# Patient Record
(demographics unavailable — no encounter records)

---

## 2024-10-31 NOTE — PHYSICAL EXAM
[TextEntry] : Gen: NAD, well-appearing HEENT: Supple neck, MMM Pulm: CTA CV: RRR, no rub Back: No spinal or CVA tenderness Abd: +BS, soft, nontender/nondistended LE: Warm, no edema Neuro: No focal deficits Psych: Normal affect Skin: Warm, without rashes MSK: Widespread gouty arthritis and tophi

## 2024-10-31 NOTE — ASSESSMENT
[FreeTextEntry1] : 67-year-old male with past medical history significant for hypertension, hyperlipidemia, coronary artery disease status post PCI and gouty arthritis who was seen in outpatient nephrology clinic today for discussion of labs and evaluation of renal function.  Chronic kidney disease stage III: CKD likely from renal atherosclerotic disease, likely component of acute kidney injury in setting of losartan and spironolactone use Serum creatinine ranging between 1.5- 1.7 mg/deciliter expect baseline to be in high 40s Last labs were about 2 mg/deciliter, spironolactone was placed on hold post those labs.  Checking new set of labs today to evaluate the response post discontinuation of spironolactone. Will also be checking urinalysis, UACR and BMD labs Discussed goals of blood pressure less than 140/90 and LDL less than 100  Hypertension: Controlled, as above  Hyperlipidemia: Controlled, continue on current dose of atorvastatin Chronic gout with tophi: Continue on allopurinol

## 2024-10-31 NOTE — HISTORY OF PRESENT ILLNESS
[FreeTextEntry1] : Patient is a 67-year-old male with past medical history significant for hypertension, hyperlipidemia, coronary artery disease status post PCI and gouty arthritis who was seen in outpatient nephrology clinic today for discussion of labs and evaluation of renal function.  Follows cardiology is on a combination of spironolactone and losartan Denies dysuria, gross hematuria, SOB, CP, cough, expectoration, fever, chills, palpitation, syncope, urgency, hesitancy, swelling on feet.  No history of chronic NSAID use, nephrolithiasis or renal diseases in the family.   REVIEW OF SYSTEM:  All systems were reviewed in detail.  Pertinent positive and negatives have been mentioned in history of present illness.  The rest are negative.

## 2024-12-11 NOTE — HISTORY OF PRESENT ILLNESS
[FreeTextEntry1] : This is a 67-year-old male who has been followed by rheumatology in the past (initially seeing Dr. Wolf in 2021 and Dr. 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 Also has chronic symptoms of carpal tunnel   Patient has had chronic pain in his elbows, knees; has seen Ortho for injections in the past  Patient has also been on diclofenac, meloxicam in the past, takes etodolac as needed  In 2021 patient was on allopurinol 200mg but did not continue it. Denies recent episodes of gout flare      Initial visit 12/20/23: He was started on allopurinol    -Last visit:  10/2024   He was on allopurinol to 300mg daily  which was increased to 400mg  No recent gout flare  Reports overall joint pain improved.     Labs   10/24/24  Uric acid 6.5 GFR 47   10/4/24  Uric acid 8.2 GFR 36   9/2024  Uric acid 8.6 GFR 43   12/20/2023   CBC with WBC 6.7, hemoglobin 13, platelet 358   CMP with Cr 1.47 GFR 52   ESR 59 CRP 20 3 uric acid 9   7/ 2023   Normal/negative rheumatoid factor, NICOLASA, SSA SSB, double-stranded DNA, CK   Uric acid 8.4   ESR 35 normal for age CRP negative   Creatinine 1.41 GFR 55      Imaging   xr knee   IMPRESSION: Right knee: There is lobulated density along the medial and lateral joint lines likely related to underlying tophus given history of gout. Chronic appearing erosive change is seen along the periphery of the medial tibial plateau and likely along the periphery of the lateral femoral condyle. Small joint effusion. No evidence of acute fracture. Mild to moderate tricompartmental arthrosis of the knee. Left knee: Lobulated density along the medial joint line and posterior joint capsule is likely related to tophus given history of gout. Probable osseous erosions along the posterior margin of the tibia. Small joint effusion. Mild tricompartmental arthrosis of the knee. No evidence of acute fracture. Atherosclerotic disease.   Xr elbow      IMPRESSION: Left elbow: Numerous mineralized bodies are seen in the region of the olecranon bursa and the posterior margin of the proximal ulna. Associated swelling within the olecranon bursa. Findings are consistent with patient's history of gout with associated olecranon bursitis. No definite osseous erosion. No joint effusion. No evidence of acute fracture. Triceps enthesophyte. Right elbow: Interval development of numerous mineralized bodies along the region of the olecranon and the posterior margin of the proximal ulna. Associated soft tissue swelling within the olecranon bursa. Findings are consistent with patient's history of gout with associated olecranon bursitis. Multiple new chronic osseous erosions are seen about the olecranon at the triceps insertion. No definite joint effusion. Joint spaces are preserved. No acute fracture.      Xr hand       IMPRESSION: Right hand: Tophi are seen about the first metacarpal phalangeal, second proximal interphalangeal, third proximal interphalangeal, and fifth metacarpal phalangeal joints. Associated prominent osseous erosions are seen about these tophi, most evident at the second proximal interphalangeal joint and fifth metacarpophalangeal joints. Additional osseous erosions are scattered about the carpus and distal ulna. No evidence of acute fracture. Left hand: Tophi are seen about the thumb metacarpal phalangeal, second and fifth metacarpophalangeal, third proximal interphalangeal, fourth proximal interphalangeal, and fifth distal interphalangeal joints with associated prominent osseous erosions, most prominent at the fifth metacarpal phalangeal joint. Additional osseous erosions are seen about the carpus and distal ulna. Chronic avulsion of the ulnar styloid. No evidence of acute fracture.

## 2024-12-11 NOTE — PHYSICAL EXAM
[TextEntry] : GENERAL: Appears in no acute distress HEENT: EOMI. No conjunctival erythema. Moist mucous membranes. No nasopharyngeal ulcers NECK: Supple, no cervical lymphadenopathy CARDIOVASCULAR: RRR. S1, S2 auscultated. PULMONARY: Clear to auscultation b/l, ABDOMINAL: Soft, , nondistended. 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 Diffuse joint tenderness to palpation of elbows, joints of the hands, and knees (improved) Decreased ROM of b/l upper and lower extremities SKIN: No rashes NEURO: No focal deficits. Motor strength 5/5 in major muscle groups of b/l UE and LE. Sensation to soft touch intact in major dermatomes of b/l UE and LE. PSYCH: . Normal affect and thought process.

## 2024-12-11 NOTE — ASSESSMENT
[FreeTextEntry1] :   This is a 67-year-old Macedonian-speaking male with hypertension, diabetes, CKD, prior alcohol use, questionable cirrhosis, GI bleed (reports he had a normal 11/2023 colonoscopy besides hemorrhoids and endoscopy showing infection ?H pylori), obesity who has been followed by rheumatology in the past (initially seeing Dr. Wolf in 2021 and more recently Dr. 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 His imaging reflecting tophaceous gout with erosions in knees, elbows, hands   Also has chronic symptoms of carpal tunnel   In the past has seen Ortho for injections.   In 2021 patient was on allopurinol 200mg but did not continue it.   Treatment with ULT is indicated due to joint pain in the setting of tophi, of note he has tolerated allopurinol in the past but did not continue   -On 12/20/2023, Started allopurinol   -Currently on allopurinol 400mg with no gout flares. Will check uric acid  and plan is to increase to allopurinol 500mg.  -Prednisone taper with 20mg daily 7 days, 10 mg daily 7 days, 5 mg daily 7 days, then stop with each increase in dose to avoid gout flares. Avoid NSAIDs while on prednisone, take with food   -Discussed lifestyle management and diet with patient including but not limited to avoiding excessive alcohol, shellfish, red meat, fructose consumption which can increase serum urate levels and trigger gout attacks.   Advised on weight loss. Advised on increased consumption of low-fat dairy products, tart cherry juice which can decrease serum urate levels. Close evaluation for and treatment of hypertension, dyslipidemia, coronary artery disease, diabetes, as gout is associated with metabolic syndrome.   -Discussed side effects of allopurinol (Side effects of allopurinol were discussed with the pt in detail including risk of acute gout flare on initiation, nausea, diarrhea, rash, abnormal LFTs)   -Discussed side effects of prednisone; advised on the risk of long-term steroids including but not limited to osteonecrosis, peptic ulcers/erosive gastritis, elevated blood pressure, elevated blood sugar, weight gain, osteoporosis, cushingoid features, cataracts, glaucoma, psychosis, infections. Pt was advised to monitor blood sugar and blood pressure routinely. Only take as needed for flares.   -CKD : now following with nephrology    Follow up 3M Total time spent in review of patient history, clinical exam, management, counseling, and plan of care:  30min

## 2025-03-12 NOTE — ASSESSMENT
[FreeTextEntry1] :  This is a 67-year-old  male with hypertension, diabetes, CKD, prior alcohol use, questionable cirrhosis, GI bleed (reports he had a normal 11/2023 colonoscopy besides hemorrhoids and endoscopy showing infection ?H pylori), obesity who has been followed by rheumatology in the past (initially seeing Dr. Wolf in 2021 and more recently Dr. 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 His imaging reflecting tophaceous gout with erosions in knees, elbows, hands   Also has chronic symptoms of carpal tunnel  In the past has seen Ortho for injections.  In 2021 patient was on allopurinol 200mg but did not continue it.  Treatment with ULT is indicated due to joint pain in the setting of tophi, of note he has tolerated allopurinol in the past but did not continue   -On 12/20/2023, Started allopurinol  -Currently on allopurinol 500mg with no gout flares. Will check uric acid and plan is to increase to allopurinol 600mg.  -Prednisone taper with 20mg daily 7 days, 10 mg daily 7 days, 5 mg daily 7 days, then stop with each increase in dose to avoid gout flares. Avoid NSAIDs while on prednisone, take with food  -Discussed lifestyle management and diet with patient including but not limited to avoiding excessive alcohol, shellfish, red meat, fructose consumption which can increase serum urate levels and trigger gout attacks.  Advised on weight loss. Advised on increased consumption of low-fat dairy products, tart cherry juice which can decrease serum urate levels. Close evaluation for and treatment of hypertension, dyslipidemia, coronary artery disease, diabetes, as gout is associated with metabolic syndrome.  -Discussed side effects of allopurinol (Side effects of allopurinol were discussed with the pt in detail including risk of acute gout flare on initiation, nausea, diarrhea, rash, abnormal LFTs)  -Discussed side effects of prednisone; advised on the risk of long-term steroids including but not limited to osteonecrosis, peptic ulcers/erosive gastritis, elevated blood pressure, elevated blood sugar, weight gain, osteoporosis, cushingoid features, cataracts, glaucoma, psychosis, infections. Pt was advised to monitor blood sugar and blood pressure routinely. Only take as needed for flares.  -CKD : now following with nephrology  - Hx of carpel tunnel, patient would like eval by hand ortho for injections for numbness/tingling symptoms    Total time spent in review of patient history, clinical exam, management, counseling, and plan of care:  30min

## 2025-03-12 NOTE — PHYSICAL EXAM
[TextEntry] :     GENERAL: Appears in no acute distress HEENT: EOMI. No conjunctival erythema. Moist mucous membranes. No nasopharyngeal ulcers NECK: Supple, no cervical lymphadenopathy CARDIOVASCULAR: RRR. S1, S2 auscultated. PULMONARY: Clear to auscultation b/l, ABDOMINAL: Soft, , nondistended. 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 Diffuse joint tenderness to palpation of elbows, joints of the hands, and knees (improved) Decreased ROM of b/l upper and lower extremities SKIN: No rashes NEURO: No focal deficits. Motor strength 5/5 in major muscle groups of b/l UE and LE. PSYCH: . Normal affect and thought process.

## 2025-03-12 NOTE — HISTORY OF PRESENT ILLNESS
[FreeTextEntry1] : This is a 67-year-old male who has been followed by rheumatology in the past (initially seeing Dr. Wolf in 2021 and Dr. 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 Also has chronic symptoms of carpal tunnel  Patient has had chronic pain in his elbows, knees; has seen Ortho for injections in the past  Patient has also been on diclofenac, meloxicam in the past, takes etodolac as needed  In 2021 patient was on allopurinol 200mg but did not continue it. Denies recent episodes of gout flare        Initial visit 12/20/23:  He was started on allopurinol   -Last visit:   12/2024  He was on allopurinol to 400mg daily which was increased to 500mg  No recent gout flare  Reports overall joint pain improved. Thinks tophi have decreased He would like ortho eval for carpel tunnel        Labs   12/2024  Uric acid 6.2  GFR 56   10/24/24  Uric acid 6.5 GFR 47   10/4/24  Uric acid 8.2 GFR 36    9/2024  Uric acid 8.6 GFR 43   12/20/2023  CBC with WBC 6.7, hemoglobin 13, platelet 358  CMP with Cr 1.47 GFR 52  ESR 59 CRP 20 3 uric acid 9      7/ 2023  Normal/negative rheumatoid factor, NICOLASA, SSA SSB, double-stranded DNA, CK   Uric acid 8.4  ESR 35 normal for age CRP negative  Creatinine 1.41 GFR 55            Imaging   xr knee   IMPRESSION: Right knee: There is lobulated density along the medial and lateral joint lines likely related to underlying tophus given history of gout. Chronic appearing erosive change is seen along the periphery of the medial tibial plateau and likely along the periphery of the lateral femoral condyle. Small joint effusion. No evidence of acute fracture. Mild to moderate tricompartmental arthrosis of the knee. Left knee: Lobulated density along the medial joint line and posterior joint capsule is likely related to tophus given history of gout. Probable osseous erosions along the posterior margin of the tibia. Small joint effusion. Mild tricompartmental arthrosis of the knee. No evidence of acute fracture. Atherosclerotic disease.      Xr elbow    IMPRESSION: Left elbow: Numerous mineralized bodies are seen in the region of the olecranon bursa and the posterior margin of the proximal ulna. Associated swelling within the olecranon bursa. Findings are consistent with patient's history of gout with associated olecranon bursitis. No definite osseous erosion. No joint effusion. No evidence of acute fracture. Triceps enthesophyte. Right elbow: Interval development of numerous mineralized bodies along the region of the olecranon and the posterior margin of the proximal ulna. Associated soft tissue swelling within the olecranon bursa. Findings are consistent with patient's history of gout with associated olecranon bursitis. Multiple new chronic osseous erosions are seen about the olecranon at the triceps insertion. No definite joint effusion. Joint spaces are preserved. No acute fracture.     Xr hand      IMPRESSION: Right hand: Tophi are seen about the first metacarpal phalangeal, second proximal interphalangeal, third proximal interphalangeal, and fifth metacarpal phalangeal joints. Associated prominent osseous erosions are seen about these tophi, most evident at the second proximal interphalangeal joint and fifth metacarpophalangeal joints. Additional osseous erosions are scattered about the carpus and distal ulna. No evidence of acute fracture. Left hand: Tophi are seen about the thumb metacarpal phalangeal, second and fifth metacarpophalangeal, third proximal interphalangeal, fourth proximal interphalangeal, and fifth distal interphalangeal joints with associated prominent osseous erosions, most prominent at the fifth metacarpal phalangeal joint. Additional osseous erosions are seen about the carpus and distal ulna. Chronic avulsion of the ulnar styloid. No evidence of acute fracture.

## 2025-03-17 NOTE — PHYSICAL EXAM
[de-identified] : Exam [bilateral] wrist + Durkan's with + N/T. There is + tinel. Patient is able to delineate numbness to median-sided digits volarly. [Mild to moderate right greater than left thenar atrophy and mild weakness] Multiple sites of gouty deposits in multiple small joints [de-identified] : [4] views of [bilateral hands and wrists] were obtained today in my office and were seen by me and discussed with the patient.  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

## 2025-03-17 NOTE — ASSESSMENT
[FreeTextEntry1] : ASSESSMENT: The patient comes in today with Bonnick exacerbated complaints of bilateral wrist and hand discomfort.  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  He is currently being treated for this.  We have discussed carpal tunnel findings.  We have discussed the severity clinically.  The patient does elect for injections today.  Will continue to follow   The patient was adequately and thoroughly informed of my assessment of their current condition(s).  - This may diminish bodily function for the extremity. We discussed prognosis, tx modalities including operative and nonoperative options for the above diagnostic assessment. As always, 2nd opinion is always provided as an option.  When accessible, I was able to review other physicians note(s) including reviewing other tests, imaging results as well as personally view these results for my own interpretation.   Injection:   The risks and benefits of a steroid injection were discussed in detail. The risks include but are not limited to: pain, infection, swelling, flare response, bleeding, subcutaneous fat atrophy, skin depigmentation and/or elevation of blood sugar. The risk of incomplete resolution of symptoms, recurrence and additional intervention was reviewed and considered by the patient. The patient agreed to proceed and under a sterile prep, I injected 1 unit 6mg into 1 cc of a combination of Celestone and Lidocaine into the bilateral carpal tunnel. The patient tolerated the injection well.  The patient was adequately and thoroughly informed of my assessment of their current condition(s).  DISCUSSION: 1.  As above.  And activity modification bracing.  Follow-up 6 weeks 2.   3. [x]

## 2025-03-17 NOTE — HISTORY OF PRESENT ILLNESS
[FreeTextEntry1] : Talon is a pleasant 67-year-old male presenting today with a chronic history of gout as well as bilateral wrist and hand discomfort.  Patient describes burning and numbness and tingling.  It is affecting ADLs

## 2025-03-18 NOTE — HISTORY OF PRESENT ILLNESS
[FreeTextEntry1] : Patient is a 67-year-old male with past medical history significant for hypertension, hyperlipidemia, coronary artery disease status post PCI and gouty arthritis who was seen in outpatient nephrology clinic today for follow up of CKD3 Patient reports no health-related adverse event since last clinic visit.  NO ER/Hospitalization/urgent care visit. Denies any cardiac or urinary complaints. No dysuria, gross hematuria, SOB, LUTS. Reports BP has been well controlled. Follows rheumatology for chronic gouty arthritis, denies any acute flare REVIEW OF SYSTEM:  All systems were reviewed in detail.  Pertinent positive and negatives have been mentioned in history of present illness.  The rest are negative.

## 2025-03-18 NOTE — REASON FOR VISIT
[Follow-Up] : a follow-up visit [Language [employer] ] : provided by [employer]   [FreeTextEntry3] : [id number] [TWNoteComboBox1] : [place]

## 2025-03-18 NOTE — ASSESSMENT
[FreeTextEntry1] : 67-year-old male with past medical history significant for hypertension, hyperlipidemia, coronary artery disease status post PCI and gouty arthritis who was seen in outpatient nephrology clinic today for discussion of labs and evaluation of renal function.  Chronic kidney disease stage III: CKD likely from renal atherosclerotic disease Serum creatinine ranging between 1.5- 1.7 mg/deciliter Last serum creatinine obtained on 3/12/2025 also within range at 1.48 for a GFR of 52 Urinalysis bland with no significant proteinuria. BMD labs within normal limits. Discussed goals of blood pressure less than 140/90 and LDL less than 100  Hypertension: Controlled, as above.  Continue with amlodipine, Cozaar, metoprolol Hyperlipidemia: Controlled, continue on current dose of atorvastatin Chronic gout with tophi: Continue on allopurinol, For close follow-up with rheumatology.

## 2025-04-29 NOTE — ASSESSMENT
[FreeTextEntry1] : ASSESSMENT: The patient comes in today with chronic exacerbated complaints of bilateral wrist and hand discomfort.  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  He is currently being treated for this.  We have discussed carpal tunnel findings.  We have discussed the severity clinically.  The patient does elect for injections today.  Will continue to follow.   The patient was adequately and thoroughly informed of my assessment of their current condition(s).  - This may diminish bodily function for the extremity. We discussed prognosis, tx modalities including operative and nonoperative options for the above diagnostic assessment. As always, 2nd opinion is always provided as an option.  When accessible, I was able to review other physicians note(s) including reviewing other tests, imaging results as well as personally view these results for my own interpretation.   Injection procedure for bilateral carpal tunnel:   The risks and benefits of a steroid injection were discussed in detail. The risks include but are not limited to: pain, infection, swelling, flare response, bleeding, subcutaneous fat atrophy, skin depigmentation and/or elevation of blood sugar. The risk of incomplete resolution of symptoms, recurrence and additional intervention was reviewed and considered by the patient. The patient agreed to proceed and under a sterile prep, I injected 1 unit 6mg into 1 cc of a combination of Celestone and Lidocaine into the above stated location for the procedure. The patient tolerated the injection well.   The patient was adequately and thoroughly informed of my assessment of their current condition(s).  DISCUSSION: 1.  Injections as above.  And activity modification bracing.  Follow-up 1 month as req 2.  Positive response.  We have discussed surgical management thoroughly 3. [x]

## 2025-04-29 NOTE — PHYSICAL EXAM
[de-identified] : Exam [bilateral] wrist + Durkan's with + N/T. There is + tinel. Patient is able to delineate numbness to median-sided digits volarly. [Mild to moderate right greater than left thenar atrophy and mild weakness] Multiple sites of gouty deposits in multiple small joints [de-identified] : [4] views of [bilateral hands and wrists] were reviewed today in my office and were seen by me and discussed with the patient.  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

## 2025-05-27 NOTE — PHYSICAL EXAM
[de-identified] : Exam [bilateral] wrist + Durkan's with + N/T. There is + tinel. Patient is able to delineate numbness to median-sided digits volarly. [Mild to moderate right greater than left thenar atrophy and mild weakness] Multiple sites of gouty deposits in multiple small joints

## 2025-05-27 NOTE — ASSESSMENT
[FreeTextEntry1] : ASSESSMENT: The patient comes in today with chronic exacerbated complaints of bilateral wrist and hand discomfort.  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  He is currently being treated for this.  We have discussed carpal tunnel findings.  We have discussed the severity clinically.  The patient does elect for injection today.  Will continue to follow. We have discussed carpal tunnel surgery.   The patient has significant findings consistent with carpal tunnel syndrome. We discussed nerve study findings when available and prognosis of this condition.  I told them that surgery would be of significant benefit for their acute painful symptoms, however the efficacy of surgery for sensory and motor recovery will be determined only with time.  These might not improve at all. With this in mind they elected to proceed with a carpal tunnel release.   Surgical Consent Discussion:   I explained the risks and benefits of surgical intervention to the patient. The risks include, but are not limited to: pain, infection, swelling, stiffness, injury to underlying neurovascular structures, scar sensitivity, incomplete resolution of symptoms, the possibility of the need for future surgery and finally the need to comply with a post-operative occupational therapy protocol. She understands, agrees and informed consent was obtained. The patients surgery will be scheduled in the near future.   The patient was adequately and thoroughly informed of my assessment of their current condition(s).  - This may diminish bodily function for the extremity. We discussed prognosis, tx modalities including operative and nonoperative options for the above diagnostic assessment. As always, 2nd opinion is always provided as an option.  When accessible, I was able to review other physicians note(s) including reviewing other tests, imaging results as well as personally view these results for my own interpretation.   Injection procedure for left carpal tunnel:   The risks and benefits of a steroid injection were discussed in detail. The risks include but are not limited to: pain, infection, swelling, flare response, bleeding, subcutaneous fat atrophy, skin depigmentation and/or elevation of blood sugar. The risk of incomplete resolution of symptoms, recurrence and additional intervention was reviewed and considered by the patient. The patient agreed to proceed and under a sterile prep, I injected 1 unit 6mg into 1 cc of a combination of Celestone and Lidocaine into the above stated location for the procedure. The patient tolerated the injection well.   The patient was adequately and thoroughly informed of my assessment of their current condition(s).  DISCUSSION: 1.  Injection as above.  And activity modification bracing.   2.  Positive response.   3.  He elects to proceed with right carpal tunnel release

## 2025-05-27 NOTE — HISTORY OF PRESENT ILLNESS
[FreeTextEntry1] : Talon is a pleasant 67-year-old male presenting today with a chronic history of gout as well as bilateral wrist and hand discomfort.  Patient describes burning and numbness and tingling.  It is affecting ADLs.  We have trialed injections as well as bracing and symptoms persist.  He does tell me that injections have been helpful for him.  He would like to consider surgery. Strong history of inflammatory disease and gout

## 2025-05-27 NOTE — PHYSICAL EXAM
[de-identified] : Exam [bilateral] wrist + Durkan's with + N/T. There is + tinel. Patient is able to delineate numbness to median-sided digits volarly. [Mild to moderate right greater than left thenar atrophy and mild weakness] Multiple sites of gouty deposits in multiple small joints

## 2025-06-11 NOTE — REASON FOR VISIT
[Follow-Up: _____] : a [unfilled] follow-up visit [FreeTextEntry1] : :  Sarah [id number] (patient can speak English but used  as well today)

## 2025-06-11 NOTE — PHYSICAL EXAM
[TextEntry] : GENERAL: Appears in no acute distress HEENT: EOMI. No conjunctival erythema. Moist mucous membranes. No nasopharyngeal ulcers NECK: Supple, no cervical lymphadenopathy CARDIOVASCULAR: RRR. S1, S2 auscultated. PULMONARY: Clear to auscultation b/l, ABDOMINAL: Soft, , nondistended. 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 No joint tenderness to palpation of elbows, joints of the hands, and knees  Decreased ROM of b/l upper and lower extremities SKIN: No rashes NEURO: No focal deficits. Motor strength 5/5 in major muscle groups of b/l UE and LE. PSYCH: . Normal affect and thought process.

## 2025-06-11 NOTE — ASSESSMENT
[FreeTextEntry1] : This is a 68-year-old male with hypertension, diabetes, CKD, prior alcohol use, questionable cirrhosis, GI bleed (reports he had a normal 11/2023 colonoscopy besides hemorrhoids and endoscopy showing infection ?H pylori), obesity who has been followed by rheumatology in the past (initially seeing Dr. Wolf in 2021 and more recently Dr. 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 His imaging reflecting tophaceous gout with erosions in knees, elbows, hands      Also has chronic symptoms of carpal tunnel   In the past has seen Ortho for injections.   In 2021 patient was on allopurinol 200mg but did not continue it.   Treatment with ULT is indicated due to joint pain in the setting of tophi, of note he has tolerated allopurinol in the past but did not continue      -On 12/20/2023, Started allopurinol  -Currently on allopurinol 600mg with no gout flares. Will check uric acid and plan is to increase to allopurinol 700mg.   -Prednisone taper with 20mg daily 7 days, 10 mg daily 7 days, 5 mg daily 7 days, then stop with each increase in dose to avoid gout flares. Avoid NSAIDs while on prednisone, take with food   -Discussed lifestyle management and diet with patient including but not limited to avoiding excessive alcohol, shellfish, red meat, fructose consumption which can increase serum urate levels and trigger gout attacks.   Advised on weight loss. Advised on increased consumption of low-fat dairy products, tart cherry juice which can decrease serum urate levels. Close evaluation for and treatment of hypertension, dyslipidemia, coronary artery disease, diabetes, as gout is associated with metabolic syndrome.   -Discussed side effects of allopurinol (Side effects of allopurinol were discussed with the pt in detail including risk of acute gout flare on initiation, nausea, diarrhea, rash, abnormal LFTs)   -Discussed side effects of prednisone; advised on the risk of long-term steroids including but not limited to osteonecrosis, peptic ulcers/erosive gastritis, elevated blood pressure, elevated blood sugar, weight gain, osteoporosis, cushingoid features, cataracts, glaucoma, psychosis, infections. Pt was advised to monitor blood sugar and blood pressure routinely. Only take as needed for flares.   -CKD : now following with nephrology   - Hx of carpel tunnel,patient seeing hand ortho for injections for numbness/tingling symptoms   LABS now

## 2025-06-11 NOTE — HISTORY OF PRESENT ILLNESS
[FreeTextEntry1] : This is a 67-year-old male who has been followed by rheumatology in the past (initially seeing Dr. Wolf in 2021 and Dr. 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 Also has chronic symptoms of carpal tunnel  Patient has had chronic pain in his elbows, knees; has seen Ortho for injections in the past  Patient has also been on diclofenac, meloxicam in the past, takes etodolac as needed  In 2021 patient was on allopurinol 200mg but did not continue it. Denies recent episodes of gout flare      Initial visit 12/20/23:   He was started on allopurinol      -Last visit:   3/2025 He was on allopurinol to 500mg daily which was increased to 600mg  No recent gout flare  Reports overall joint pain improved. Thinks tophi have decreased  He  has seen hand ortho for carpel tunnel eval' s/p injections and now pending carpel tunnel release however reports this was not covered by insurance     Labs   3/2025   Uric acid 6.4  12/2024 Uric acid 6.2 GFR 56      10/24/24  Uric acid 6.5 GFR 47      10/4/24   Uric acid 8.2 GFR 36         9/2024   Uric acid 8.6 GFR 43      12/20/2023   CBC with WBC 6.7, hemoglobin 13, platelet 358   CMP with Cr 1.47 GFR 52   ESR 59 CRP 20 3 uric acid 9            7/ 2023   Normal/negative rheumatoid factor, NICOLASA, SSA SSB, double-stranded DNA, CK      Uric acid 8.4   ESR 35 normal for age CRP negative   Creatinine 1.41 GFR 55      Imaging      xr knee      IMPRESSION: Right knee: There is lobulated density along the medial and lateral joint lines likely related to underlying tophus given history of gout. Chronic appearing erosive change is seen along the periphery of the medial tibial plateau and likely along the periphery of the lateral femoral condyle. Small joint effusion. No evidence of acute fracture. Mild to moderate tricompartmental arthrosis of the knee. Left knee: Lobulated density along the medial joint line and posterior joint capsule is likely related to tophus given history of gout. Probable osseous erosions along the posterior margin of the tibia. Small joint effusion. Mild tricompartmental arthrosis of the knee. No evidence of acute fracture. Atherosclerotic disease.            Xr elbow         IMPRESSION: Left elbow: Numerous mineralized bodies are seen in the region of the olecranon bursa and the posterior margin of the proximal ulna. Associated swelling within the olecranon bursa. Findings are consistent with patient's history of gout with associated olecranon bursitis. No definite osseous erosion. No joint effusion. No evidence of acute fracture. Triceps enthesophyte. Right elbow: Interval development of numerous mineralized bodies along the region of the olecranon and the posterior margin of the proximal ulna. Associated soft tissue swelling within the olecranon bursa. Findings are consistent with patient's history of gout with associated olecranon bursitis. Multiple new chronic osseous erosions are seen about the olecranon at the triceps insertion. No definite joint effusion. Joint spaces are preserved. No acute fracture.         Xr hand            IMPRESSION: Right hand: Tophi are seen about the first metacarpal phalangeal, second proximal interphalangeal, third proximal interphalangeal, and fifth metacarpal phalangeal joints. Associated prominent osseous erosions are seen about these tophi, most evident at the second proximal interphalangeal joint and fifth metacarpophalangeal joints. Additional osseous erosions are scattered about the carpus and distal ulna. No evidence of acute fracture. Left hand: Tophi are seen about the thumb metacarpal phalangeal, second and fifth metacarpophalangeal, third proximal interphalangeal, fourth proximal interphalangeal, and fifth distal interphalangeal joints with associated prominent osseous erosions, most prominent at the fifth metacarpal phalangeal joint. Additional osseous erosions are seen about the carpus and distal ulna. Chronic avulsion of the ulnar styloid. No evidence of acute fracture.

## 2025-07-23 NOTE — HISTORY OF PRESENT ILLNESS
[FreeTextEntry1] : Talon is a pleasant 68-year-old male presenting today with a chronic history of gout as well as bilateral wrist and hand discomfort.  Patient describes burning and numbness and tingling.  It is affecting ADLs.  We have trialed injections as well as bracing and symptoms persist.  He does tell me that injections have been helpful for him.  He would like to consider surgery. Strong history of inflammatory disease and gout

## 2025-07-23 NOTE — ASSESSMENT
[FreeTextEntry1] : ASSESSMENT: The patient comes in today with chronic exacerbated complaints of bilateral wrist and hand discomfort.  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  He is currently being treated for this.  We have discussed carpal tunnel findings.  We have discussed the severity clinically.  The patient does elect for injection today.  Will continue to follow. We have discussed carpal tunnel surgery.   The patient has significant findings consistent with carpal tunnel syndrome. We discussed nerve study findings when available and prognosis of this condition.  I told them that surgery would be of significant benefit for their acute painful symptoms, however the efficacy of surgery for sensory and motor recovery will be determined only with time.  These might not improve at all. With this in mind they elected to proceed with a carpal tunnel release.   Surgical Consent Discussion:   I explained the risks and benefits of surgical intervention to the patient. The risks include, but are not limited to: pain, infection, swelling, stiffness, injury to underlying neurovascular structures, scar sensitivity, incomplete resolution of symptoms, the possibility of the need for future surgery and finally the need to comply with a post-operative occupational therapy protocol. She understands, agrees and informed consent was obtained. The patients surgery will be scheduled in the near future.   The patient was adequately and thoroughly informed of my assessment of their current condition(s).  - This may diminish bodily function for the extremity. We discussed prognosis, tx modalities including operative and nonoperative options for the above diagnostic assessment. As always, 2nd opinion is always provided as an option.  When accessible, I was able to review other physicians note(s) including reviewing other tests, imaging results as well as personally view these results for my own interpretation.   Injection procedure for bilateral carpal tunnel:   The risks and benefits of a steroid injection were discussed in detail. The risks include but are not limited to: pain, infection, swelling, flare response, bleeding, subcutaneous fat atrophy, skin depigmentation and/or elevation of blood sugar. The risk of incomplete resolution of symptoms, recurrence and additional intervention was reviewed and considered by the patient. The patient agreed to proceed and under a sterile prep, I injected 1 unit 6mg into 1 cc of a combination of Celestone and Lidocaine into the above stated location for the procedure. The patient tolerated the injection well.   The patient was adequately and thoroughly informed of my assessment of their current condition(s).  DISCUSSION: 1.  Injections as above.  And activity modification bracing.   2.  Positive response.   3.  Follow-up 2 months.  We have discussed carpal tunnel release surgery 4. Bc

## 2025-07-23 NOTE — PHYSICAL EXAM
[de-identified] : Exam [bilateral] wrist + Durkan's with + N/T. There is + tinel. Patient is able to delineate numbness to median-sided digits volarly. [Mild to moderate right greater than left thenar atrophy and mild weakness] Multiple sites of gouty deposits in multiple small joints [de-identified] : [4] views of [bilateral hands and wrists] were reviewed today in my office and were seen by me and discussed with the patient.  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